# Patient Record
Sex: FEMALE | Race: WHITE | Employment: UNEMPLOYED | URBAN - METROPOLITAN AREA
[De-identification: names, ages, dates, MRNs, and addresses within clinical notes are randomized per-mention and may not be internally consistent; named-entity substitution may affect disease eponyms.]

---

## 2024-10-22 ENCOUNTER — OFFICE VISIT (OUTPATIENT)
Age: 24
End: 2024-10-22

## 2024-10-22 VITALS
HEART RATE: 66 BPM | BODY MASS INDEX: 22.6 KG/M2 | OXYGEN SATURATION: 97 % | WEIGHT: 144 LBS | TEMPERATURE: 98.6 F | DIASTOLIC BLOOD PRESSURE: 80 MMHG | HEIGHT: 67 IN | SYSTOLIC BLOOD PRESSURE: 127 MMHG

## 2024-10-22 DIAGNOSIS — M79.645 PAIN OF FINGER OF LEFT HAND: Primary | ICD-10-CM

## 2024-10-22 DIAGNOSIS — S63.617A SPRAIN OF LEFT LITTLE FINGER, UNSPECIFIED SITE OF DIGIT, INITIAL ENCOUNTER: ICD-10-CM

## 2024-10-22 ASSESSMENT — ENCOUNTER SYMPTOMS
COUGH: 0
NAUSEA: 0
VOMITING: 0

## 2024-10-22 NOTE — PROGRESS NOTES
Asa Friedman (:  2000) is a 24 y.o. female,New patient, here for evaluation of the following chief complaint(s):  Finger Pain (Left 5th digit injury x2 days ago)      ASSESSMENT/PLAN:  1. Pain of finger of left hand    - XR FINGER LEFT (MIN 2 VIEWS); Future  -no acute finding on XR  2. Sprain of left little finger, unspecified site of digit, initial encounter    -a finger splint was applied.take Ibuprofen as needed.       Return if symptoms worsen or fail to improve.    SUBJECTIVE/OBJECTIVE:  Pt c/o 2 day h/o left 5th finger pain after a blunt injury,stated that the pain is improving      History provided by:  Patient      Vitals:    10/22/24 1929   BP: 127/80   Site: Right Upper Arm   Position: Sitting   Cuff Size: Medium Adult   Pulse: 66   Temp: 98.6 °F (37 °C)   TempSrc: Oral   SpO2: 97%   Weight: 65.3 kg (144 lb)   Height: 1.702 m (5' 7\")       Review of Systems   Constitutional:  Negative for activity change, appetite change and fever.   Respiratory:  Negative for cough.    Cardiovascular:  Negative for chest pain.   Gastrointestinal:  Negative for nausea and vomiting.   Skin:  Negative for rash and wound.   Neurological:  Negative for headaches.       Physical Exam  Constitutional:       General: She is not in acute distress.  HENT:      Mouth/Throat:      Mouth: Mucous membranes are moist.      Pharynx: No posterior oropharyngeal erythema.   Eyes:      Conjunctiva/sclera: Conjunctivae normal.      Pupils: Pupils are equal, round, and reactive to light.   Cardiovascular:      Rate and Rhythm: Normal rate and regular rhythm.   Pulmonary:      Effort: Pulmonary effort is normal. No respiratory distress.   Musculoskeletal:         General: Tenderness (mild,left 5th finger,no swelling or deformity,has good ROM,no nail injury.) present. No swelling or deformity. Normal range of motion.      Cervical back: Neck supple. No rigidity.   Lymphadenopathy:      Cervical: No cervical adenopathy.   Skin: